# Patient Record
Sex: MALE | Race: WHITE | NOT HISPANIC OR LATINO | URBAN - METROPOLITAN AREA
[De-identification: names, ages, dates, MRNs, and addresses within clinical notes are randomized per-mention and may not be internally consistent; named-entity substitution may affect disease eponyms.]

---

## 2019-06-29 ENCOUNTER — EMERGENCY (EMERGENCY)
Facility: HOSPITAL | Age: 44
LOS: 0 days | Discharge: HOME | End: 2019-06-29
Admitting: EMERGENCY MEDICINE
Payer: OTHER MISCELLANEOUS

## 2019-06-29 VITALS
RESPIRATION RATE: 18 BRPM | HEIGHT: 69 IN | HEART RATE: 92 BPM | WEIGHT: 240.08 LBS | DIASTOLIC BLOOD PRESSURE: 97 MMHG | OXYGEN SATURATION: 98 % | SYSTOLIC BLOOD PRESSURE: 151 MMHG | TEMPERATURE: 96 F
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
## 2019-06-29 DIAGNOSIS — S50.812A ABRASION OF LEFT FOREARM, INITIAL ENCOUNTER: ICD-10-CM
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
## 2019-06-29 PROCEDURE — 29125 APPL SHORT ARM SPLINT STATIC: CPT

## 2019-06-29 PROCEDURE — 73090 X-RAY EXAM OF FOREARM: CPT | Mod: 26,LT

## 2019-06-29 PROCEDURE — 73110 X-RAY EXAM OF WRIST: CPT | Mod: 26,LT

## 2019-06-29 PROCEDURE — 99283 EMERGENCY DEPT VISIT LOW MDM: CPT | Mod: 25

## 2019-06-29 RX ORDER — IBUPROFEN 200 MG
600 TABLET ORAL ONCE
Refills: 0 | Status: COMPLETED | OUTPATIENT
Start: 2019-06-29 | End: 2019-06-29

## 2019-06-29 RX ADMIN — Medication 600 MILLIGRAM(S): at 10:53

## 2019-06-29 NOTE — ED PROVIDER NOTE - CARE PROVIDER_API CALL
Titi Hdz (MD)  Orthopaedic Surgery  [address] [place] [address]  Phone: [phone]  Fax: [phone]  Follow Up Time: 1-3 Days

## 2019-06-29 NOTE — ED ADULT TRIAGE NOTE - CHIEF COMPLAINT QUOTE
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 Currently c/o left arm and and leg pain rated 6/10. denies LOC

## 2019-06-29 NOTE — ED ADULT NURSE NOTE - OBJECTIVE STATEMENT
Pt presented after falling approximately 5ft at work. As per pt, was standing on a structure that collapsed and fell on Left side of body. Pt states he was wearing his hard hat when he fell. Denies LOC, dizziness, n/v. L arm wrapped in sling and board. No open injuries noted. Pt able to move fingers of L hand and straighten out L leg. C/o tingling in L hand.

## 2019-06-29 NOTE — ED PROVIDER NOTE - OBJECTIVE STATEMENT
45 y/o M, no significant PMHx, presents to the ED with complaints of left arm pain s/p mechanical fall this AM while at work. Patient states that at approximately 45 y/o M, no significant PMHx, presents to the ED with complaints of left arm pain s/p mechanical fall this AM while at work. 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 He admits to left sided elbow, forearm, wrist and knee discomfort. He was wearing his hard-hat; denies LOC, nausea, vomiting, neck pain, chest pain, dyspnea, hip pain, gait abnormality and additional injuries. He denies any blood thinner use.

## 2019-06-29 NOTE — ED PROVIDER NOTE - MUSCULOSKELETAL, MLM
No obvious deformity to left upper extremity ; + tenderness along left elbow and wrist with full ROM exhibited ; full ROM exhibited to left knee without joint laxity ; + tenderness along lateral aspect of left knee ; Spine appears normal

## 2019-06-29 NOTE — ED ADULT NURSE NOTE - NSIMPLEMENTINTERV_GEN_ALL_ED
Implemented All Fall Risk Interventions:  [place] to call system. Call bell, personal items and telephone within reach. Instruct patient to call for assistance. Room bathroom lighting operational. Non-slip footwear when patient is off stretcher. Physically safe environment: no spills, clutter or unnecessary equipment. Stretcher in lowest position, wheels locked, appropriate side rails in place. Provide visual cue, wrist band, yellow gown, etc. Monitor gait and stability. Monitor for mental status changes and reorient to person, place, and time. Review medications for side effects contributing to fall risk. Reinforce activity limits and safety measures with patient and family.

## 2019-06-29 NOTE — ED PROVIDER NOTE - NSFOLLOWUPINSTRUCTIONS_ED_ALL_ED_FT
Follow up with orthopedic specialist.    Cast or Splint Care, Adult  Casts and splints are supports that are worn to protect broken bones and other injuries. A cast or splint may hold a bone still and in the correct position while it heals. Casts and splints may also help ease pain, swelling, and muscle spasms.    A cast is a hardened support that is usually made of fiberglass or plaster. It is custom-fit to the body and it offers more protection than a splint. It cannot be taken off and put back on. A splint is a type of soft support that is usually made from cloth and elastic. It can be adjusted or taken off as needed.    You may need a cast or a splint if you:    Have a broken bone.  Have a soft-tissue injury.  Need to keep an injured body part from moving (keep it immobile) after surgery.    How is this treated?  If you have a cast:     Do not stick anything inside the cast to scratch your skin. Sticking something in the cast increases your risk of infection.  Check the skin around the cast every day. Tell your health care provider about any concerns.  You may put lotion on dry skin around the edges of the cast. Do not put lotion on the skin underneath the cast.  Keep the cast clean.  ImageIf the cast is not waterproof:    Do not let it get wet.  Cover it with a watertight covering when you take a bath or a shower.    If you have a splint:     Wear it as told by your health care provider. Remove it only as told by your health care provider.  Loosen the splint if your fingers or toes tingle, become numb, or turn cold and blue.  Keep the splint clean.  ImageIf the splint is not waterproof:    Do not let it get wet.  Cover it with a watertight covering when you take a bath or a shower.    Bathing     Do not take baths or swim until your health care provider approves. Ask your health care provider if you can take showers. You may only be allowed to take sponge baths for bathing.  If your cast or splint is not waterproof, cover it with a watertight covering when you take a bath or shower.  Managing pain, stiffness, and swelling     Move your fingers or toes often to avoid stiffness and to lessen swelling.  Raise (elevate) the injured area above the level of your heart while sitting or lying down.  Safety     Do not use the injured limb to support your body weight until your health care provider says that it is okay.  Use crutches or other assistive devices as told by your health care provider.  General instructions     Do not put pressure on any part of the cast or splint until it is fully hardened. This may take several hours.  Return to your normal activities as told by your health care provider. Ask your health care provider what activities are safe for you.  Take over-the-counter and prescription medicines only as told by your health care provider.  Keep all follow-up visits as told by your health care provider. This is important.  Contact a health care provider if:  Your cast or splint gets damaged.  The skin around the cast gets red or raw.  The skin under the cast is extremely itchy or painful.  Your cast or splint feels very uncomfortable.  Your cast or splint is too tight or too loose.  Your cast becomes wet or it develops a soft spot or area.  You get an object stuck under your cast.  Get help right away if:  Your pain is getting worse.  The injured area tingles, becomes numb, or turns cold and blue.  The part of your body above or below the cast is swollen and discolored.  You cannot feel or move your fingers or toes.  There is fluid leaking through the cast.  You have severe pain or pressure under the cast.  You have trouble breathing.  You have shortness of breath.  You have chest pain.  This information is not intended to replace advice given to you by your health care provider. Make sure you discuss any questions you have with your health care provider.